# Patient Record
Sex: FEMALE | Race: WHITE | HISPANIC OR LATINO | Employment: UNEMPLOYED | URBAN - METROPOLITAN AREA
[De-identification: names, ages, dates, MRNs, and addresses within clinical notes are randomized per-mention and may not be internally consistent; named-entity substitution may affect disease eponyms.]

---

## 2019-12-16 LAB
ABO + RH BLD: NORMAL
BLD GP AB SCN SERPL QL: NEGATIVE
HBV SURFACE AG SER QL: NEGATIVE
HIV 1+2 AB+HIV1 P24 AG SERPL QL IA: NON REACTIVE
RPR SER QL: NON REACTIVE
RUBV IGG SERPL IA-ACNC: NORMAL

## 2020-03-03 ENCOUNTER — CLINICAL ABSTRACT (OUTPATIENT)
Dept: MATERNAL FETAL MEDICINE | Age: 35
End: 2020-03-03

## 2020-03-03 DIAGNOSIS — O09.529 AMA (ADVANCED MATERNAL AGE) MULTIGRAVIDA 35+: Primary | ICD-10-CM

## 2020-03-04 ENCOUNTER — OFFICE VISIT (OUTPATIENT)
Dept: MATERNAL FETAL MEDICINE | Age: 35
End: 2020-03-04
Attending: OBSTETRICS & GYNECOLOGY

## 2020-03-04 VITALS
BODY MASS INDEX: 21.5 KG/M2 | WEIGHT: 137 LBS | DIASTOLIC BLOOD PRESSURE: 66 MMHG | HEIGHT: 67 IN | SYSTOLIC BLOOD PRESSURE: 98 MMHG

## 2020-03-04 DIAGNOSIS — O99.282 HYPOTHYROIDISM AFFECTING PREGNANCY IN SECOND TRIMESTER: ICD-10-CM

## 2020-03-04 DIAGNOSIS — O26.22 RECURRENT PREGNANCY LOSS IN PREGNANT PATIENT IN SECOND TRIMESTER, ANTEPARTUM: ICD-10-CM

## 2020-03-04 DIAGNOSIS — E03.9 HYPOTHYROIDISM AFFECTING PREGNANCY IN SECOND TRIMESTER: ICD-10-CM

## 2020-03-04 DIAGNOSIS — E03.9 HYPOTHYROIDISM, UNSPECIFIED TYPE: ICD-10-CM

## 2020-03-04 DIAGNOSIS — O09.522 MULTIGRAVIDA OF ADVANCED MATERNAL AGE IN SECOND TRIMESTER: Primary | ICD-10-CM

## 2020-03-04 DIAGNOSIS — O09.529 AMA (ADVANCED MATERNAL AGE) MULTIGRAVIDA 35+: ICD-10-CM

## 2020-03-04 DIAGNOSIS — N96 HISTORY OF MULTIPLE MISCARRIAGES: ICD-10-CM

## 2020-03-04 PROCEDURE — 99243 OFF/OP CNSLTJ NEW/EST LOW 30: CPT | Performed by: OBSTETRICS & GYNECOLOGY

## 2020-03-04 PROCEDURE — X1094 NO CHARGE VISIT: HCPCS | Performed by: OBSTETRICS & GYNECOLOGY

## 2020-03-04 PROCEDURE — 76811 OB US DETAILED SNGL FETUS: CPT | Performed by: OBSTETRICS & GYNECOLOGY

## 2020-03-04 RX ORDER — VITAMIN A ACETATE, BETA CAROTENE, ASCORBIC ACID, CHOLECALCIFEROL, .ALPHA.-TOCOPHEROL ACETATE, DL-, THIAMINE MONONITRATE, RIBOFLAVIN, NIACINAMIDE, PYRIDOXINE HYDROCHLORIDE, FOLIC ACID, CYANOCOBALAMIN, CALCIUM CARBONATE, FERROUS FUMARATE, ZINC OXIDE, CUPRIC OXIDE 3080; 12; 120; 400; 1; 1.84; 3; 20; 22; 920; 25; 200; 27; 10; 2 [IU]/1; UG/1; MG/1; [IU]/1; MG/1; MG/1; MG/1; MG/1; MG/1; [IU]/1; MG/1; MG/1; MG/1; MG/1; MG/1
1 TABLET, FILM COATED ORAL DAILY
COMMUNITY

## 2020-03-04 RX ORDER — LEVOTHYROXINE SODIUM 0.07 MG/1
75 TABLET ORAL DAILY
COMMUNITY
End: 2023-04-05 | Stop reason: SDUPTHER

## 2020-03-05 ENCOUNTER — TELEPHONE (OUTPATIENT)
Dept: MATERNAL FETAL MEDICINE | Age: 35
End: 2020-03-05

## 2020-03-06 ENCOUNTER — TELEPHONE (OUTPATIENT)
Dept: MATERNAL FETAL MEDICINE | Age: 35
End: 2020-03-06

## 2020-03-10 ENCOUNTER — TELEPHONE (OUTPATIENT)
Dept: MATERNAL FETAL MEDICINE | Age: 35
End: 2020-03-10

## 2020-03-11 ENCOUNTER — TELEPHONE (OUTPATIENT)
Dept: MATERNAL FETAL MEDICINE | Age: 35
End: 2020-03-11

## 2020-03-18 ENCOUNTER — TELEPHONE (OUTPATIENT)
Dept: MATERNAL FETAL MEDICINE | Age: 35
End: 2020-03-18

## 2020-04-28 LAB
HIV 1+2 AB+HIV1 P24 AG SERPL QL IA: NON REACTIVE
RPR SER QL: NON REACTIVE

## 2020-06-16 ENCOUNTER — TELEPHONE (OUTPATIENT)
Dept: MATERNAL FETAL MEDICINE | Age: 35
End: 2020-06-16

## 2020-06-17 ENCOUNTER — TELEPHONE (OUTPATIENT)
Dept: MATERNAL FETAL MEDICINE | Age: 35
End: 2020-06-17

## 2020-06-18 LAB — GBS EXTERNAL: POSITIVE

## 2020-06-22 ENCOUNTER — TELEPHONE (OUTPATIENT)
Dept: MATERNAL FETAL MEDICINE | Age: 35
End: 2020-06-22

## 2020-07-01 ENCOUNTER — TELEPHONE (OUTPATIENT)
Dept: MATERNAL FETAL MEDICINE | Age: 35
End: 2020-07-01

## 2020-07-01 ENCOUNTER — CLINICAL ABSTRACT (OUTPATIENT)
Dept: MATERNAL FETAL MEDICINE | Age: 35
End: 2020-07-01

## 2020-07-01 DIAGNOSIS — E03.9 HYPOTHYROIDISM: ICD-10-CM

## 2020-07-01 DIAGNOSIS — O09.529 AMA (ADVANCED MATERNAL AGE) MULTIGRAVIDA 35+: Primary | ICD-10-CM

## 2020-07-02 ENCOUNTER — OFFICE VISIT (OUTPATIENT)
Dept: MATERNAL FETAL MEDICINE | Age: 35
End: 2020-07-02
Attending: OBSTETRICS & GYNECOLOGY

## 2020-07-02 ENCOUNTER — APPOINTMENT (OUTPATIENT)
Dept: MATERNAL FETAL MEDICINE | Age: 35
End: 2020-07-02

## 2020-07-02 ENCOUNTER — OB CHECK (OUTPATIENT)
Dept: MATERNAL FETAL MEDICINE | Age: 35
End: 2020-07-02

## 2020-07-02 DIAGNOSIS — O09.523 MULTIGRAVIDA OF ADVANCED MATERNAL AGE IN THIRD TRIMESTER: Primary | ICD-10-CM

## 2020-07-02 DIAGNOSIS — O26.22 RECURRENT PREGNANCY LOSS IN PREGNANT PATIENT IN SECOND TRIMESTER, ANTEPARTUM: ICD-10-CM

## 2020-07-02 DIAGNOSIS — O09.523 MULTIGRAVIDA OF ADVANCED MATERNAL AGE IN THIRD TRIMESTER: ICD-10-CM

## 2020-07-02 DIAGNOSIS — E03.9 HYPOTHYROIDISM, UNSPECIFIED TYPE: ICD-10-CM

## 2020-07-02 DIAGNOSIS — E03.9 HYPOTHYROID: ICD-10-CM

## 2020-07-02 DIAGNOSIS — O09.529 AMA (ADVANCED MATERNAL AGE) MULTIGRAVIDA 35+: ICD-10-CM

## 2020-07-02 PROCEDURE — 76816 OB US FOLLOW-UP PER FETUS: CPT | Performed by: OBSTETRICS & GYNECOLOGY

## 2020-07-02 PROCEDURE — 59025 FETAL NON-STRESS TEST: CPT | Performed by: OBSTETRICS & GYNECOLOGY

## 2020-07-07 ENCOUNTER — TELEPHONE (OUTPATIENT)
Dept: MATERNAL FETAL MEDICINE | Age: 35
End: 2020-07-07

## 2020-07-09 ENCOUNTER — OFFICE VISIT (OUTPATIENT)
Dept: MATERNAL FETAL MEDICINE | Age: 35
End: 2020-07-09
Attending: OBSTETRICS & GYNECOLOGY

## 2020-07-09 ENCOUNTER — HOSPITAL ENCOUNTER (INPATIENT)
Age: 35
LOS: 2 days | Discharge: HOME | End: 2020-07-11
Attending: OBSTETRICS & GYNECOLOGY | Admitting: OBSTETRICS & GYNECOLOGY

## 2020-07-09 ENCOUNTER — APPOINTMENT (OUTPATIENT)
Dept: MATERNAL FETAL MEDICINE | Age: 35
End: 2020-07-09
Attending: OBSTETRICS & GYNECOLOGY

## 2020-07-09 DIAGNOSIS — O09.523 MULTIGRAVIDA OF ADVANCED MATERNAL AGE IN THIRD TRIMESTER: ICD-10-CM

## 2020-07-09 DIAGNOSIS — O09.529 AMA (ADVANCED MATERNAL AGE) MULTIGRAVIDA 35+: Primary | ICD-10-CM

## 2020-07-09 DIAGNOSIS — E03.9 HYPOTHYROIDISM: ICD-10-CM

## 2020-07-09 LAB
ABO + RH BLD: NORMAL
BASOPHILS # BLD: 0 K/MCL (ref 0–0.3)
BASOPHILS NFR BLD: 0 %
BLD GP AB SCN SERPL QL GEL: NEGATIVE
BLOOD BANK CMNT PATIENT-IMP: NORMAL
CROSSMATCH EXPIRE: NORMAL
DIFFERENTIAL METHOD BLD: ABNORMAL
EOSINOPHIL # BLD: 0 K/MCL (ref 0.1–0.5)
EOSINOPHIL NFR BLD: 0 %
ERYTHROCYTE [DISTWIDTH] IN BLOOD: 14.8 % (ref 11–15)
HCT VFR BLD CALC: 36.3 % (ref 36–46.5)
HGB BLD-MCNC: 12 G/DL (ref 12–15.5)
IMM GRANULOCYTES # BLD AUTO: 0.1 K/MCL (ref 0–0.2)
IMM GRANULOCYTES NFR BLD: 1 %
LYMPHOCYTES # BLD: 1.6 K/MCL (ref 1–4.8)
LYMPHOCYTES NFR BLD: 17 %
MCH RBC QN AUTO: 29.2 PG (ref 26–34)
MCHC RBC AUTO-ENTMCNC: 33.1 G/DL (ref 32–36.5)
MCV RBC AUTO: 88.3 FL (ref 78–100)
MONOCYTES # BLD: 0.6 K/MCL (ref 0.3–0.9)
MONOCYTES NFR BLD: 7 %
NEUTROPHILS # BLD: 7 K/MCL (ref 1.8–7.7)
NEUTROPHILS NFR BLD: 75 %
NRBC BLD MANUAL-RTO: 0 /100 WBC
PLATELET # BLD: 180 K/MCL (ref 140–450)
RBC # BLD: 4.11 MIL/MCL (ref 4–5.2)
SARS-COV-2 RNA RESP QL NAA+PROBE: NOT DETECTED
SERVICE CMNT-IMP: NORMAL
SPECIMEN SOURCE: NORMAL
WBC # BLD: 9.3 K/MCL (ref 4.2–11)

## 2020-07-09 PROCEDURE — 10002807 HB RX 258: Performed by: OBSTETRICS & GYNECOLOGY

## 2020-07-09 PROCEDURE — 10002800 HB RX 250 W HCPCS: Performed by: OBSTETRICS & GYNECOLOGY

## 2020-07-09 PROCEDURE — 59025 FETAL NON-STRESS TEST: CPT | Performed by: OBSTETRICS & GYNECOLOGY

## 2020-07-09 PROCEDURE — 87635 SARS-COV-2 COVID-19 AMP PRB: CPT

## 2020-07-09 PROCEDURE — 85025 COMPLETE CBC W/AUTO DIFF WBC: CPT

## 2020-07-09 PROCEDURE — 76815 OB US LIMITED FETUS(S): CPT | Performed by: OBSTETRICS & GYNECOLOGY

## 2020-07-09 PROCEDURE — 86901 BLOOD TYPING SEROLOGIC RH(D): CPT

## 2020-07-09 PROCEDURE — 13003286 HB ROOM CHARGE L&D

## 2020-07-09 RX ORDER — OXYTOCIN 10 [USP'U]/ML
10 INJECTION, SOLUTION INTRAMUSCULAR; INTRAVENOUS ONCE
Status: DISCONTINUED | OUTPATIENT
Start: 2020-07-09 | End: 2020-07-11 | Stop reason: HOSPADM

## 2020-07-09 RX ORDER — OXYTOCIN/0.9 % SODIUM CHLORIDE 30/500 ML
0-334 PLASTIC BAG, INJECTION (ML) INTRAVENOUS CONTINUOUS
Status: DISCONTINUED | OUTPATIENT
Start: 2020-07-09 | End: 2020-07-10

## 2020-07-09 RX ORDER — LIDOCAINE HYDROCHLORIDE 10 MG/ML
30 INJECTION, SOLUTION EPIDURAL; INFILTRATION; INTRACAUDAL; PERINEURAL
Status: DISCONTINUED | OUTPATIENT
Start: 2020-07-09 | End: 2020-07-11 | Stop reason: HOSPADM

## 2020-07-09 RX ORDER — SODIUM CHLORIDE 9 MG/ML
INJECTION, SOLUTION INTRAVENOUS
Status: DISPENSED
Start: 2020-07-09 | End: 2020-07-10

## 2020-07-09 RX ORDER — OXYTOCIN/0.9 % SODIUM CHLORIDE 30/500 ML
0-25 PLASTIC BAG, INJECTION (ML) INTRAVENOUS CONTINUOUS
Status: DISCONTINUED | OUTPATIENT
Start: 2020-07-09 | End: 2020-07-11 | Stop reason: HOSPADM

## 2020-07-09 RX ORDER — SODIUM CHLORIDE, SODIUM LACTATE, POTASSIUM CHLORIDE, CALCIUM CHLORIDE 600; 310; 30; 20 MG/100ML; MG/100ML; MG/100ML; MG/100ML
INJECTION, SOLUTION INTRAVENOUS CONTINUOUS
Status: DISCONTINUED | OUTPATIENT
Start: 2020-07-09 | End: 2020-07-10

## 2020-07-09 RX ORDER — AMPICILLIN 2 G/1
INJECTION, POWDER, FOR SOLUTION INTRAVENOUS
Status: DISPENSED
Start: 2020-07-09 | End: 2020-07-10

## 2020-07-09 RX ADMIN — AMPICILLIN SODIUM 1000 MG: 1 INJECTION, POWDER, FOR SOLUTION INTRAMUSCULAR; INTRAVENOUS at 21:05

## 2020-07-09 RX ADMIN — SODIUM CHLORIDE, SODIUM LACTATE, POTASSIUM CHLORIDE, AND CALCIUM CHLORIDE: .6; .31; .03; .02 INJECTION, SOLUTION INTRAVENOUS at 16:49

## 2020-07-09 RX ADMIN — AMPICILLIN SODIUM 2000 MG: 2 INJECTION, POWDER, FOR SOLUTION INTRAVENOUS at 16:57

## 2020-07-09 RX ADMIN — OXYTOCIN-SODIUM CHLORIDE 0.9% IV SOLN 30 UNIT/500ML 2 MILLI-UNITS/MIN: 30-0.9/5 SOLUTION at 17:08

## 2020-07-09 ASSESSMENT — COGNITIVE AND FUNCTIONAL STATUS - GENERAL
DO YOU HAVE SERIOUS DIFFICULTY WALKING OR CLIMBING STAIRS: NO
ARE YOU DEAF OR DO YOU HAVE SERIOUS DIFFICULTY  HEARING: NO
ARE YOU BLIND OR DO YOU HAVE SERIOUS DIFFICULTY SEEING, EVEN WHEN WEARING GLASSES: NO
BECAUSE OF A PHYSICAL, MENTAL, OR EMOTIONAL CONDITION, DO YOU HAVE SERIOUS DIFFICULTY CONCENTRATING, REMEMBERING OR MAKING DECISIONS: NO
BECAUSE OF A PHYSICAL, MENTAL, OR EMOTIONAL CONDITION, DO YOU HAVE DIFFICULTY DOING ERRANDS ALONE: NO
DO YOU HAVE DIFFICULTY DRESSING OR BATHING: NO

## 2020-07-09 ASSESSMENT — ACTIVITIES OF DAILY LIVING (ADL)
ADL_SCORE: 12
ADL_BEFORE_ADMISSION: INDEPENDENT
CHRONIC_PAIN_PRESENT: NO
RECENT_DECLINE_ADL: NO

## 2020-07-09 ASSESSMENT — LIFESTYLE VARIABLES
ALCOHOL_USE_STATUS: NO OR LOW RISK WITH VALIDATED TOOL
HOW MANY STANDARD DRINKS CONTAINING ALCOHOL DO YOU HAVE ON A TYPICAL DAY: 0,1 OR 2
HOW OFTEN DO YOU HAVE A DRINK CONTAINING ALCOHOL: NEVER
AUDIT-C TOTAL SCORE: 0
HOW OFTEN DO YOU HAVE 6 OR MORE DRINKS ON ONE OCCASION: NEVER

## 2020-07-10 ENCOUNTER — ANESTHESIA EVENT (OUTPATIENT)
Dept: OBGYN | Age: 35
End: 2020-07-10

## 2020-07-10 ENCOUNTER — ANESTHESIA (OUTPATIENT)
Dept: OBGYN | Age: 35
End: 2020-07-10

## 2020-07-10 PROCEDURE — 10000002 HB ROOM CHARGE MED SURG

## 2020-07-10 PROCEDURE — 3E033VJ INTRODUCTION OF OTHER HORMONE INTO PERIPHERAL VEIN, PERCUTANEOUS APPROACH: ICD-10-PCS | Performed by: OBSTETRICS & GYNECOLOGY

## 2020-07-10 PROCEDURE — 10004451 HB PACU RECOVERY 1ST 30 MINUTES

## 2020-07-10 PROCEDURE — 13000012 HB ANESTHESIA SPINAL/EPIDURAL IN L&D

## 2020-07-10 PROCEDURE — 10004651 HB RX, NO CHARGE ITEM: Performed by: OBSTETRICS & GYNECOLOGY

## 2020-07-10 PROCEDURE — 10002800 HB RX 250 W HCPCS

## 2020-07-10 PROCEDURE — 10002801 HB RX 250 W/O HCPCS

## 2020-07-10 PROCEDURE — 10002803 HB RX 637: Performed by: OBSTETRICS & GYNECOLOGY

## 2020-07-10 PROCEDURE — 13003251 HB VAGINAL DELIVERY HIGH RISK

## 2020-07-10 PROCEDURE — 13001455 HB PERINATAL CARE HIGH RISK

## 2020-07-10 PROCEDURE — 10002807 HB RX 258: Performed by: OBSTETRICS & GYNECOLOGY

## 2020-07-10 PROCEDURE — S9445 PT EDUCATION NOC INDIVID: HCPCS

## 2020-07-10 PROCEDURE — 10004452 HB PACU ADDL 30 MINUTES

## 2020-07-10 PROCEDURE — 10002800 HB RX 250 W HCPCS: Performed by: OBSTETRICS & GYNECOLOGY

## 2020-07-10 PROCEDURE — 10907ZC DRAINAGE OF AMNIOTIC FLUID, THERAPEUTIC FROM PRODUCTS OF CONCEPTION, VIA NATURAL OR ARTIFICIAL OPENING: ICD-10-PCS | Performed by: OBSTETRICS & GYNECOLOGY

## 2020-07-10 RX ORDER — EPHEDRINE SULFATE 50 MG/ML
INJECTION, SOLUTION INTRAVENOUS
Status: DISCONTINUED
Start: 2020-07-10 | End: 2020-07-10 | Stop reason: WASHOUT

## 2020-07-10 RX ORDER — 0.9 % SODIUM CHLORIDE 0.9 %
2 VIAL (ML) INJECTION EVERY 12 HOURS SCHEDULED
Status: DISCONTINUED | OUTPATIENT
Start: 2020-07-10 | End: 2020-07-11 | Stop reason: HOSPADM

## 2020-07-10 RX ORDER — IBUPROFEN 600 MG/1
600 TABLET ORAL EVERY 6 HOURS
Status: DISCONTINUED | OUTPATIENT
Start: 2020-07-10 | End: 2020-07-11 | Stop reason: HOSPADM

## 2020-07-10 RX ORDER — SODIUM CHLORIDE, SODIUM LACTATE, POTASSIUM CHLORIDE, CALCIUM CHLORIDE 600; 310; 30; 20 MG/100ML; MG/100ML; MG/100ML; MG/100ML
INJECTION, SOLUTION INTRAVENOUS CONTINUOUS
Status: ACTIVE | OUTPATIENT
Start: 2020-07-10 | End: 2020-07-10

## 2020-07-10 RX ORDER — ACETAMINOPHEN 325 MG/1
650 TABLET ORAL EVERY 6 HOURS
Status: DISCONTINUED | OUTPATIENT
Start: 2020-07-10 | End: 2020-07-11 | Stop reason: HOSPADM

## 2020-07-10 RX ORDER — ONDANSETRON 2 MG/ML
4 INJECTION INTRAMUSCULAR; INTRAVENOUS 2 TIMES DAILY PRN
Status: DISCONTINUED | OUTPATIENT
Start: 2020-07-10 | End: 2020-07-11 | Stop reason: HOSPADM

## 2020-07-10 RX ORDER — LEVOTHYROXINE SODIUM 0.07 MG/1
75 TABLET ORAL
Status: DISCONTINUED | OUTPATIENT
Start: 2020-07-10 | End: 2020-07-11 | Stop reason: HOSPADM

## 2020-07-10 RX ORDER — BUPIVACAINE HYDROCHLORIDE 2.5 MG/ML
10 INJECTION, SOLUTION EPIDURAL; INFILTRATION; INTRACAUDAL ONCE
Status: COMPLETED | OUTPATIENT
Start: 2020-07-10 | End: 2020-07-10

## 2020-07-10 RX ORDER — CALCIUM CARBONATE 500 MG/1
500 TABLET, CHEWABLE ORAL EVERY 4 HOURS PRN
Status: DISCONTINUED | OUTPATIENT
Start: 2020-07-10 | End: 2020-07-11 | Stop reason: HOSPADM

## 2020-07-10 RX ORDER — BUPIVACAINE HYDROCHLORIDE 2.5 MG/ML
INJECTION, SOLUTION EPIDURAL; INFILTRATION; INTRACAUDAL
Status: DISPENSED
Start: 2020-07-10 | End: 2020-07-10

## 2020-07-10 RX ORDER — HEPARIN SOD,PORK IN 0.45% NACL 25000/500
INTRAVENOUS SOLUTION INTRAVENOUS
Status: COMPLETED
Start: 2020-07-10 | End: 2020-07-10

## 2020-07-10 RX ORDER — VITAMIN A, VITAMIN C, VITAMIN D-3, VITAMIN E, VITAMIN B-1, VITAMIN B-2, NIACIN, VITAMIN B-6, CALCIUM, IRON, ZINC, COPPER 4000; 120; 400; 22; 1.84; 3; 20; 10; 1; 12; 200; 27; 25; 2 [IU]/1; MG/1; [IU]/1; MG/1; MG/1; MG/1; MG/1; MG/1; MG/1; UG/1; MG/1; MG/1; MG/1; MG/1
1 TABLET ORAL DAILY
Status: DISCONTINUED | OUTPATIENT
Start: 2020-07-10 | End: 2020-07-11 | Stop reason: HOSPADM

## 2020-07-10 RX ORDER — HYDROCODONE BITARTRATE AND ACETAMINOPHEN 5; 325 MG/1; MG/1
1 TABLET ORAL EVERY 4 HOURS PRN
Status: DISCONTINUED | OUTPATIENT
Start: 2020-07-10 | End: 2020-07-11 | Stop reason: HOSPADM

## 2020-07-10 RX ORDER — HEPARIN SOD,PORK IN 0.45% NACL 25000/500
INTRAVENOUS SOLUTION INTRAVENOUS CONTINUOUS PRN
Status: DISCONTINUED | OUTPATIENT
Start: 2020-07-10 | End: 2020-07-10

## 2020-07-10 RX ADMIN — OXYTOCIN-SODIUM CHLORIDE 0.9% IV SOLN 30 UNIT/500ML 97 ML/HR: 30-0.9/5 SOLUTION at 04:40

## 2020-07-10 RX ADMIN — OXYTOCIN-SODIUM CHLORIDE 0.9% IV SOLN 30 UNIT/500ML 334 ML/HR: 30-0.9/5 SOLUTION at 03:08

## 2020-07-10 RX ADMIN — FENTANYL 0.2 MG/100ML-BUPIV 0.125%-NACL 0.9% EPIDURAL INJ 10 ML/HR: 2/0.125 SOLUTION at 02:14

## 2020-07-10 RX ADMIN — IBUPROFEN 600 MG: 600 TABLET ORAL at 17:55

## 2020-07-10 RX ADMIN — VITAMIN A, VITAMIN C, VITAMIN D-3, VITAMIN E, VITAMIN B-1, VITAMIN B-2, NIACIN, VITAMIN B-6, CALCIUM, IRON, ZINC, COPPER 1 TABLET: 4000; 120; 400; 22; 1.84; 3; 20; 10; 1; 12; 200; 27; 25; 2 TABLET ORAL at 09:07

## 2020-07-10 RX ADMIN — BUPIVACAINE HYDROCHLORIDE 5 ML: 2.5 INJECTION, SOLUTION EPIDURAL; INFILTRATION; INTRACAUDAL; PERINEURAL at 02:10

## 2020-07-10 RX ADMIN — OXYTOCIN-SODIUM CHLORIDE 0.9% IV SOLN 30 UNIT/500ML 97 ML/HR: 30-0.9/5 SOLUTION at 03:40

## 2020-07-10 RX ADMIN — FENTANYL CITRATE 100 MCG: 50 INJECTION INTRAMUSCULAR; INTRAVENOUS at 02:10

## 2020-07-10 RX ADMIN — AMPICILLIN SODIUM 1000 MG: 1 INJECTION, POWDER, FOR SOLUTION INTRAMUSCULAR; INTRAVENOUS at 01:46

## 2020-07-10 RX ADMIN — SODIUM CHLORIDE, SODIUM LACTATE, POTASSIUM CHLORIDE, AND CALCIUM CHLORIDE: .6; .31; .03; .02 INJECTION, SOLUTION INTRAVENOUS at 01:40

## 2020-07-10 RX ADMIN — SODIUM CHLORIDE 2 ML: 9 INJECTION, SOLUTION INTRAMUSCULAR; INTRAVENOUS; SUBCUTANEOUS at 17:00

## 2020-07-10 ASSESSMENT — PAIN SCALES - GENERAL
PAINLEVEL_OUTOF10: 2
PAINLEVEL_OUTOF10: 0
PAINLEVEL_OUTOF10: 0

## 2020-07-11 VITALS
DIASTOLIC BLOOD PRESSURE: 71 MMHG | HEIGHT: 66 IN | HEART RATE: 95 BPM | TEMPERATURE: 97.7 F | OXYGEN SATURATION: 97 % | WEIGHT: 154 LBS | RESPIRATION RATE: 18 BRPM | SYSTOLIC BLOOD PRESSURE: 108 MMHG | BODY MASS INDEX: 24.75 KG/M2

## 2020-07-11 LAB
BASOPHILS # BLD: 0.1 K/MCL (ref 0–0.3)
BASOPHILS NFR BLD: 0 %
DIFFERENTIAL METHOD BLD: ABNORMAL
EOSINOPHIL # BLD: 0.1 K/MCL (ref 0.1–0.5)
EOSINOPHIL NFR BLD: 1 %
ERYTHROCYTE [DISTWIDTH] IN BLOOD: 14.8 % (ref 11–15)
HCT VFR BLD CALC: 29.5 % (ref 36–46.5)
HGB BLD-MCNC: 10 G/DL (ref 12–15.5)
IMM GRANULOCYTES # BLD AUTO: 0.2 K/MCL (ref 0–0.2)
IMM GRANULOCYTES NFR BLD: 1 %
LYMPHOCYTES # BLD: 1.7 K/MCL (ref 1–4.8)
LYMPHOCYTES NFR BLD: 15 %
MCH RBC QN AUTO: 29.8 PG (ref 26–34)
MCHC RBC AUTO-ENTMCNC: 33.9 G/DL (ref 32–36.5)
MCV RBC AUTO: 87.8 FL (ref 78–100)
MONOCYTES # BLD: 0.6 K/MCL (ref 0.3–0.9)
MONOCYTES NFR BLD: 5 %
NEUTROPHILS # BLD: 8.8 K/MCL (ref 1.8–7.7)
NEUTROPHILS NFR BLD: 78 %
NRBC BLD MANUAL-RTO: 0 /100 WBC
PLATELET # BLD: 163 K/MCL (ref 140–450)
RBC # BLD: 3.36 MIL/MCL (ref 4–5.2)
WBC # BLD: 11.4 K/MCL (ref 4.2–11)

## 2020-07-11 PROCEDURE — 36415 COLL VENOUS BLD VENIPUNCTURE: CPT

## 2020-07-11 PROCEDURE — S9445 PT EDUCATION NOC INDIVID: HCPCS

## 2020-07-11 PROCEDURE — 85025 COMPLETE CBC W/AUTO DIFF WBC: CPT

## 2020-07-11 PROCEDURE — 10002803 HB RX 637: Performed by: OBSTETRICS & GYNECOLOGY

## 2020-07-11 RX ORDER — ACETAMINOPHEN 325 MG/1
650 TABLET ORAL EVERY 6 HOURS
Status: SHIPPED | COMMUNITY
Start: 2020-07-11

## 2020-07-11 RX ORDER — IBUPROFEN 600 MG/1
600 TABLET ORAL EVERY 6 HOURS
Status: SHIPPED | COMMUNITY
Start: 2020-07-11

## 2020-07-11 RX ADMIN — IBUPROFEN 600 MG: 600 TABLET ORAL at 00:15

## 2020-07-11 RX ADMIN — LEVOTHYROXINE SODIUM 75 MCG: 75 TABLET ORAL at 06:08

## 2020-07-11 RX ADMIN — IBUPROFEN 600 MG: 600 TABLET ORAL at 12:21

## 2020-07-11 RX ADMIN — IBUPROFEN 600 MG: 600 TABLET ORAL at 06:09

## 2020-07-11 RX ADMIN — VITAMIN A, VITAMIN C, VITAMIN D-3, VITAMIN E, VITAMIN B-1, VITAMIN B-2, NIACIN, VITAMIN B-6, CALCIUM, IRON, ZINC, COPPER 1 TABLET: 4000; 120; 400; 22; 1.84; 3; 20; 10; 1; 12; 200; 27; 25; 2 TABLET ORAL at 08:02

## 2020-07-11 ASSESSMENT — PAIN SCALES - GENERAL
PAINLEVEL_OUTOF10: 1
PAINLEVEL_OUTOF10: 0

## 2020-07-16 ENCOUNTER — APPOINTMENT (OUTPATIENT)
Dept: MATERNAL FETAL MEDICINE | Age: 35
End: 2020-07-16

## 2021-11-30 ENCOUNTER — HOSPITAL ENCOUNTER (EMERGENCY)
Dept: HOSPITAL 26 - MED | Age: 36
Discharge: HOME | End: 2021-11-30
Payer: COMMERCIAL

## 2021-11-30 VITALS — DIASTOLIC BLOOD PRESSURE: 54 MMHG | SYSTOLIC BLOOD PRESSURE: 104 MMHG

## 2021-11-30 VITALS — WEIGHT: 160 LBS | HEIGHT: 63 IN | BODY MASS INDEX: 28.35 KG/M2

## 2021-11-30 VITALS — SYSTOLIC BLOOD PRESSURE: 120 MMHG | DIASTOLIC BLOOD PRESSURE: 92 MMHG

## 2021-11-30 DIAGNOSIS — Z79.899: ICD-10-CM

## 2021-11-30 DIAGNOSIS — Z3A.01: ICD-10-CM

## 2021-11-30 DIAGNOSIS — Z98.51: ICD-10-CM

## 2021-11-30 DIAGNOSIS — O46.91: Primary | ICD-10-CM

## 2021-11-30 LAB
ALBUMIN FLD-MCNC: 4 G/DL (ref 3.4–5)
ANION GAP SERPL CALCULATED.3IONS-SCNC: 17.1 MMOL/L (ref 8–16)
APPEARANCE UR: CLEAR
AST SERPL-CCNC: 13 U/L (ref 15–37)
BASOPHILS # BLD AUTO: 0 K/UL (ref 0–0.22)
BASOPHILS NFR BLD AUTO: 0.4 % (ref 0–2)
BILIRUB SERPL-MCNC: 0.3 MG/DL (ref 0–1)
BILIRUB UR QL STRIP: NEGATIVE
BUN SERPL-MCNC: 17 MG/DL (ref 7–18)
CHLORIDE SERPL-SCNC: 101 MMOL/L (ref 98–107)
CO2 SERPL-SCNC: 25.8 MMOL/L (ref 21–32)
COLOR UR: YELLOW
CREAT SERPL-MCNC: 0.6 MG/DL (ref 0.6–1.3)
EOSINOPHIL # BLD AUTO: 0 K/UL (ref 0–0.4)
EOSINOPHIL NFR BLD AUTO: 0.3 % (ref 0–4)
ERYTHROCYTE [DISTWIDTH] IN BLOOD BY AUTOMATED COUNT: 13.7 % (ref 11.6–13.7)
GFR SERPL CREATININE-BSD FRML MDRD: 145 ML/MIN (ref 90–?)
GLUCOSE SERPL-MCNC: 94 MG/DL (ref 74–106)
GLUCOSE UR STRIP-MCNC: NEGATIVE MG/DL
HCT VFR BLD AUTO: 36.8 % (ref 36–48)
HGB BLD-MCNC: 12.6 G/DL (ref 12–16)
HGB UR QL STRIP: (no result)
LEUKOCYTE ESTERASE UR QL STRIP: NEGATIVE
LYMPHOCYTES # BLD AUTO: 1.6 K/UL (ref 2.5–16.5)
LYMPHOCYTES NFR BLD AUTO: 24.1 % (ref 20.5–51.1)
MCH RBC QN AUTO: 32 PG (ref 27–31)
MCHC RBC AUTO-ENTMCNC: 34 G/DL (ref 33–37)
MCV RBC AUTO: 94.2 FL (ref 80–94)
MONOCYTES # BLD AUTO: 0.6 K/UL (ref 0.8–1)
MONOCYTES NFR BLD AUTO: 8.2 % (ref 1.7–9.3)
NEUTROPHILS # BLD AUTO: 4.5 K/UL (ref 1.8–7.7)
NEUTROPHILS NFR BLD AUTO: 67 % (ref 42.2–75.2)
NITRITE UR QL STRIP: NEGATIVE
PH UR STRIP: 6 [PH] (ref 5–9)
PLATELET # BLD AUTO: 346 K/UL (ref 140–450)
POTASSIUM SERPL-SCNC: 3.9 MMOL/L (ref 3.5–5.1)
RBC # BLD AUTO: 3.91 MIL/UL (ref 4.2–5.4)
RBC #/AREA URNS HPF: (no result) /HPF (ref 0–5)
SODIUM SERPL-SCNC: 140 MMOL/L (ref 136–145)
WBC # BLD AUTO: 6.7 K/UL (ref 4.8–10.8)
WBC,URINE: (no result) /HPF (ref 0–5)

## 2021-11-30 PROCEDURE — 80053 COMPREHEN METABOLIC PANEL: CPT

## 2021-11-30 PROCEDURE — 81025 URINE PREGNANCY TEST: CPT

## 2021-11-30 PROCEDURE — 86900 BLOOD TYPING SEROLOGIC ABO: CPT

## 2021-11-30 PROCEDURE — 99284 EMERGENCY DEPT VISIT MOD MDM: CPT

## 2021-11-30 PROCEDURE — 86886 COOMBS TEST INDIRECT TITER: CPT

## 2021-11-30 PROCEDURE — 84702 CHORIONIC GONADOTROPIN TEST: CPT

## 2021-11-30 PROCEDURE — 76817 TRANSVAGINAL US OBSTETRIC: CPT

## 2021-11-30 PROCEDURE — 36415 COLL VENOUS BLD VENIPUNCTURE: CPT

## 2021-11-30 PROCEDURE — 86901 BLOOD TYPING SEROLOGIC RH(D): CPT

## 2021-11-30 PROCEDURE — 81001 URINALYSIS AUTO W/SCOPE: CPT

## 2021-11-30 PROCEDURE — 85025 COMPLETE CBC W/AUTO DIFF WBC: CPT

## 2021-11-30 NOTE — NUR
PATIENT CLEARED FOR DISHCARGE AT THIS TIME AND ADVISED TO RETURN FOR REPEAT 
BLOOD HCG DRAW IN 2 DAYS. NO OTHER COMPLAINTS OR CONCERNS FOLLOWING DISHCARGE 
TEACHING.

## 2021-12-02 ENCOUNTER — HOSPITAL ENCOUNTER (EMERGENCY)
Dept: HOSPITAL 26 - MED | Age: 36
Discharge: HOME | End: 2021-12-02
Payer: COMMERCIAL

## 2021-12-02 VITALS — BODY MASS INDEX: 29.44 KG/M2 | HEIGHT: 62 IN | WEIGHT: 160 LBS

## 2021-12-02 VITALS — DIASTOLIC BLOOD PRESSURE: 81 MMHG | SYSTOLIC BLOOD PRESSURE: 127 MMHG

## 2021-12-02 VITALS — SYSTOLIC BLOOD PRESSURE: 123 MMHG | DIASTOLIC BLOOD PRESSURE: 78 MMHG

## 2021-12-02 DIAGNOSIS — O23.41: Primary | ICD-10-CM

## 2021-12-02 DIAGNOSIS — Z79.899: ICD-10-CM

## 2021-12-02 DIAGNOSIS — O20.0: ICD-10-CM

## 2021-12-02 DIAGNOSIS — Z3A.01: ICD-10-CM

## 2021-12-02 DIAGNOSIS — Z98.890: ICD-10-CM

## 2021-12-02 LAB
APPEARANCE UR: CLEAR
BASOPHILS # BLD AUTO: 0 K/UL (ref 0–0.22)
BASOPHILS NFR BLD AUTO: 0.5 % (ref 0–2)
BILIRUB UR QL STRIP: NEGATIVE
COLOR UR: YELLOW
EOSINOPHIL # BLD AUTO: 0 K/UL (ref 0–0.4)
EOSINOPHIL NFR BLD AUTO: 0.2 % (ref 0–4)
ERYTHROCYTE [DISTWIDTH] IN BLOOD BY AUTOMATED COUNT: 13.3 % (ref 11.6–13.7)
GLUCOSE UR STRIP-MCNC: NEGATIVE MG/DL
HCT VFR BLD AUTO: 37.3 % (ref 36–48)
HGB BLD-MCNC: 12.5 G/DL (ref 12–16)
HGB UR QL STRIP: (no result)
LEUKOCYTE ESTERASE UR QL STRIP: (no result)
LYMPHOCYTES # BLD AUTO: 1.4 K/UL (ref 2.5–16.5)
LYMPHOCYTES NFR BLD AUTO: 19.5 % (ref 20.5–51.1)
MCH RBC QN AUTO: 32 PG (ref 27–31)
MCHC RBC AUTO-ENTMCNC: 34 G/DL (ref 33–37)
MCV RBC AUTO: 94.5 FL (ref 80–94)
MONOCYTES # BLD AUTO: 0.6 K/UL (ref 0.8–1)
MONOCYTES NFR BLD AUTO: 7.7 % (ref 1.7–9.3)
NEUTROPHILS # BLD AUTO: 5.3 K/UL (ref 1.8–7.7)
NEUTROPHILS NFR BLD AUTO: 72.1 % (ref 42.2–75.2)
NITRITE UR QL STRIP: NEGATIVE
PH UR STRIP: 6.5 [PH] (ref 5–9)
PLATELET # BLD AUTO: 357 K/UL (ref 140–450)
RBC # BLD AUTO: 3.95 MIL/UL (ref 4.2–5.4)
RBC #/AREA URNS HPF: (no result) /HPF (ref 0–5)
WBC # BLD AUTO: 7.4 K/UL (ref 4.8–10.8)
WBC,URINE: (no result) /HPF (ref 0–5)

## 2021-12-02 NOTE — NUR
Patient discharged with v/s stable. Written and verbal after care instructions 
given THREATENED MISCARRIAGE AND PREGNANCY AND UTI and explained. 

Patient alert, oriented and verbalized understanding of instructions. 
Ambulatory with steady gait. All questions addressed prior to discharge. ID 
band removed. Patient advised to follow up with PMD. Rx of MACROBID given. 
Patient educated on indication of medication including possible reaction and 
side effects. Opportunity to ask questions provided and answered.

## 2021-12-02 NOTE — NUR
35 Y/O FEMALE HERE FOR RECHECK FOR HCG BLOOD DRAW. PT C/O VAGINAL BLEEDING WITH 
LOWER ABDOMINAL PAIN 7/10 DESCRIBES AS CRAMPING  X2 DAYS. PT SEEN HERE 2 DAYS 
AGO FOR VAGINAL BLEEDING. LMP 10/23/21. PREGNANT 5WEEKS. DENIES N/V, DENIES 
FEVER/CHILLS.



PMH: TUBAL MANIPULATION



NKA